# Patient Record
Sex: FEMALE | Race: WHITE | Employment: OTHER | URBAN - METROPOLITAN AREA
[De-identification: names, ages, dates, MRNs, and addresses within clinical notes are randomized per-mention and may not be internally consistent; named-entity substitution may affect disease eponyms.]

---

## 2017-04-27 RX ORDER — SODIUM CHLORIDE 0.9 % (FLUSH) 0.9 %
10 SYRINGE (ML) INJECTION EVERY 12 HOURS SCHEDULED
Status: CANCELLED | OUTPATIENT
Start: 2017-04-27

## 2017-04-27 RX ORDER — SODIUM CHLORIDE 0.9 % (FLUSH) 0.9 %
10 SYRINGE (ML) INJECTION PRN
Status: CANCELLED | OUTPATIENT
Start: 2017-04-27

## 2017-04-27 RX ORDER — LIDOCAINE HYDROCHLORIDE 10 MG/ML
1 INJECTION, SOLUTION EPIDURAL; INFILTRATION; INTRACAUDAL; PERINEURAL
Status: CANCELLED | OUTPATIENT
Start: 2017-04-27 | End: 2017-04-27

## 2017-04-27 RX ORDER — SODIUM CHLORIDE 9 MG/ML
INJECTION, SOLUTION INTRAVENOUS CONTINUOUS
Status: CANCELLED | OUTPATIENT
Start: 2017-04-27

## 2017-04-28 ENCOUNTER — HOSPITAL ENCOUNTER (OUTPATIENT)
Dept: MAMMOGRAPHY | Age: 72
Discharge: HOME | End: 2017-04-28
Attending: OBSTETRICS & GYNECOLOGY | Admitting: OBSTETRICS & GYNECOLOGY

## 2017-04-28 DIAGNOSIS — Z12.31 ENCOUNTER FOR SCREENING MAMMOGRAM FOR BREAST CANCER: ICD-10-CM

## 2017-05-04 ENCOUNTER — HOSPITAL ENCOUNTER (OUTPATIENT)
Dept: ENDOSCOPY | Age: 72
Discharge: HOME | End: 2017-04-28
Attending: OBSTETRICS & GYNECOLOGY | Admitting: OBSTETRICS & GYNECOLOGY

## 2017-05-04 ENCOUNTER — HOSPITAL ENCOUNTER (OUTPATIENT)
Dept: ENDOSCOPY | Age: 72
Discharge: HOME | End: 2017-05-04
Attending: INTERNAL MEDICINE | Admitting: INTERNAL MEDICINE

## 2017-05-04 RX ORDER — SODIUM CHLORIDE 9 MG/ML
INJECTION, SOLUTION INTRAVENOUS CONTINUOUS
Status: DISCONTINUED | OUTPATIENT
Start: 2017-05-04 | End: 2017-05-05 | Stop reason: HOSPADM

## 2017-05-04 RX ORDER — SODIUM CHLORIDE 0.9 % (FLUSH) 0.9 %
10 SYRINGE (ML) INJECTION PRN
Status: DISCONTINUED | OUTPATIENT
Start: 2017-05-04 | End: 2017-05-05 | Stop reason: HOSPADM

## 2017-05-04 RX ORDER — LIDOCAINE HYDROCHLORIDE 10 MG/ML
1 INJECTION, SOLUTION EPIDURAL; INFILTRATION; INTRACAUDAL; PERINEURAL
Status: ACTIVE | OUTPATIENT
Start: 2017-05-04 | End: 2017-05-04

## 2017-05-04 RX ORDER — SODIUM CHLORIDE 0.9 % (FLUSH) 0.9 %
10 SYRINGE (ML) INJECTION EVERY 12 HOURS SCHEDULED
Status: DISCONTINUED | OUTPATIENT
Start: 2017-05-04 | End: 2017-05-05 | Stop reason: HOSPADM

## 2018-09-27 ENCOUNTER — HOSPITAL ENCOUNTER (OUTPATIENT)
Dept: GENERAL RADIOLOGY | Age: 73
Discharge: HOME | End: 2018-09-27
Payer: MEDICARE

## 2018-09-27 ENCOUNTER — HOSPITAL ENCOUNTER (OUTPATIENT)
Dept: WOMENS IMAGING | Age: 73
Discharge: HOME | End: 2018-09-27
Payer: MEDICARE

## 2018-09-27 DIAGNOSIS — Z78.0 POSTMENOPAUSAL: ICD-10-CM

## 2018-09-27 DIAGNOSIS — Z12.31 VISIT FOR SCREENING MAMMOGRAM: ICD-10-CM

## 2018-09-27 PROCEDURE — 77063 BREAST TOMOSYNTHESIS BI: CPT

## 2018-09-27 PROCEDURE — 77080 DXA BONE DENSITY AXIAL: CPT

## 2019-09-30 ENCOUNTER — HOSPITAL ENCOUNTER (OUTPATIENT)
Dept: WOMENS IMAGING | Age: 74
Discharge: HOME | End: 2019-09-30
Payer: MEDICARE

## 2019-09-30 DIAGNOSIS — Z12.31 ENCOUNTER FOR SCREENING MAMMOGRAM FOR BREAST CANCER: ICD-10-CM

## 2019-09-30 PROCEDURE — 77063 BREAST TOMOSYNTHESIS BI: CPT

## 2020-11-02 ENCOUNTER — HOSPITAL ENCOUNTER (OUTPATIENT)
Dept: WOMENS IMAGING | Age: 75
Discharge: HOME | End: 2020-11-02
Payer: MEDICARE

## 2020-11-02 ENCOUNTER — HOSPITAL ENCOUNTER (OUTPATIENT)
Dept: GENERAL RADIOLOGY | Age: 75
Discharge: HOME | End: 2020-11-02
Payer: MEDICARE

## 2020-11-02 PROCEDURE — 77080 DXA BONE DENSITY AXIAL: CPT

## 2020-11-02 PROCEDURE — 77063 BREAST TOMOSYNTHESIS BI: CPT

## 2021-12-09 ENCOUNTER — HOSPITAL ENCOUNTER (OUTPATIENT)
Dept: WOMENS IMAGING | Age: 76
Discharge: HOME | End: 2021-12-09
Payer: MEDICARE

## 2021-12-09 VITALS — WEIGHT: 114 LBS | HEIGHT: 62 IN | BODY MASS INDEX: 20.98 KG/M2

## 2021-12-09 DIAGNOSIS — Z12.31 BREAST CANCER SCREENING BY MAMMOGRAM: ICD-10-CM

## 2021-12-09 PROCEDURE — 77063 BREAST TOMOSYNTHESIS BI: CPT

## 2023-02-22 ENCOUNTER — HOSPITAL ENCOUNTER (OUTPATIENT)
Dept: WOMENS IMAGING | Age: 78
Discharge: HOME | End: 2023-02-22
Payer: MEDICARE

## 2023-02-22 ENCOUNTER — HOSPITAL ENCOUNTER (OUTPATIENT)
Dept: GENERAL RADIOLOGY | Age: 78
Discharge: HOME | End: 2023-02-22
Payer: MEDICARE

## 2023-02-22 VITALS — WEIGHT: 116 LBS | HEIGHT: 61 IN | BODY MASS INDEX: 21.9 KG/M2

## 2023-02-22 DIAGNOSIS — Z12.31 VISIT FOR SCREENING MAMMOGRAM: ICD-10-CM

## 2023-02-22 DIAGNOSIS — M85.89 DISAPPEARING BONE DISEASE: ICD-10-CM

## 2023-02-22 PROCEDURE — 77067 SCR MAMMO BI INCL CAD: CPT

## 2023-02-22 PROCEDURE — 77080 DXA BONE DENSITY AXIAL: CPT

## 2023-05-15 ENCOUNTER — HOSPITAL ENCOUNTER (OUTPATIENT)
Age: 78
Setting detail: OUTPATIENT SURGERY
Discharge: HOME | End: 2023-05-15
Attending: INTERNAL MEDICINE | Admitting: INTERNAL MEDICINE
Payer: MEDICARE

## 2023-05-15 ENCOUNTER — ANESTHESIA EVENT (OUTPATIENT)
Dept: ENDOSCOPY | Age: 78
End: 2023-05-15
Payer: MEDICARE

## 2023-05-15 ENCOUNTER — ANESTHESIA (OUTPATIENT)
Dept: ENDOSCOPY | Age: 78
End: 2023-05-15
Payer: MEDICARE

## 2023-05-15 VITALS
SYSTOLIC BLOOD PRESSURE: 110 MMHG | HEIGHT: 61 IN | WEIGHT: 112 LBS | HEART RATE: 71 BPM | RESPIRATION RATE: 14 BRPM | TEMPERATURE: 97.1 F | DIASTOLIC BLOOD PRESSURE: 78 MMHG | OXYGEN SATURATION: 100 % | BODY MASS INDEX: 21.14 KG/M2

## 2023-05-15 DIAGNOSIS — Z86.010 HISTORY OF COLON POLYPS: ICD-10-CM

## 2023-05-15 DIAGNOSIS — Z80.0 FAMILY HISTORY OF COLON CANCER: ICD-10-CM

## 2023-05-15 PROCEDURE — 2580000003 HC RX 258: Performed by: ANESTHESIOLOGY

## 2023-05-15 PROCEDURE — 3700000000 HC ANESTHESIA ATTENDED CARE: Performed by: INTERNAL MEDICINE

## 2023-05-15 PROCEDURE — 2709999900 HC NON-CHARGEABLE SUPPLY: Performed by: INTERNAL MEDICINE

## 2023-05-15 PROCEDURE — 6360000002 HC RX W HCPCS: Performed by: NURSE ANESTHETIST, CERTIFIED REGISTERED

## 2023-05-15 PROCEDURE — 3700000001 HC ADD 15 MINUTES (ANESTHESIA): Performed by: INTERNAL MEDICINE

## 2023-05-15 PROCEDURE — 3609010600 HC COLONOSCOPY POLYPECTOMY SNARE/COLD BIOPSY: Performed by: INTERNAL MEDICINE

## 2023-05-15 PROCEDURE — 7100000010 HC PHASE II RECOVERY - FIRST 15 MIN: Performed by: INTERNAL MEDICINE

## 2023-05-15 PROCEDURE — 7100000011 HC PHASE II RECOVERY - ADDTL 15 MIN: Performed by: INTERNAL MEDICINE

## 2023-05-15 PROCEDURE — 88305 TISSUE EXAM BY PATHOLOGIST: CPT

## 2023-05-15 RX ORDER — SODIUM CHLORIDE, SODIUM LACTATE, POTASSIUM CHLORIDE, CALCIUM CHLORIDE 600; 310; 30; 20 MG/100ML; MG/100ML; MG/100ML; MG/100ML
INJECTION, SOLUTION INTRAVENOUS CONTINUOUS
Status: DISCONTINUED | OUTPATIENT
Start: 2023-05-15 | End: 2023-05-15 | Stop reason: HOSPADM

## 2023-05-15 RX ORDER — PROPOFOL 10 MG/ML
INJECTION, EMULSION INTRAVENOUS CONTINUOUS PRN
Status: DISCONTINUED | OUTPATIENT
Start: 2023-05-15 | End: 2023-05-15 | Stop reason: SDUPTHER

## 2023-05-15 RX ORDER — LIDOCAINE HYDROCHLORIDE 20 MG/ML
INJECTION, SOLUTION INTRAVENOUS PRN
Status: DISCONTINUED | OUTPATIENT
Start: 2023-05-15 | End: 2023-05-15 | Stop reason: SDUPTHER

## 2023-05-15 RX ADMIN — SODIUM CHLORIDE, SODIUM LACTATE, POTASSIUM CHLORIDE, AND CALCIUM CHLORIDE: .6; .31; .03; .02 INJECTION, SOLUTION INTRAVENOUS at 10:00

## 2023-05-15 RX ADMIN — LIDOCAINE HYDROCHLORIDE 100 MG: 20 INJECTION, SOLUTION INTRAVENOUS at 10:25

## 2023-05-15 RX ADMIN — PROPOFOL 140 MCG/KG/MIN: 10 INJECTION, EMULSION INTRAVENOUS at 10:25

## 2023-05-15 ASSESSMENT — PAIN SCALES - GENERAL: PAINLEVEL_OUTOF10: 0

## 2023-05-15 ASSESSMENT — PAIN SCALES - WONG BAKER: WONGBAKER_NUMERICALRESPONSE: 0

## 2023-05-15 ASSESSMENT — LIFESTYLE VARIABLES: SMOKING_STATUS: 0

## 2023-05-15 NOTE — OP NOTE
Colonoscopy Procedure Note    Patient: Ishmael Matthew MRN: [id number]     YOB: 1945  Age: 65 y.o. Sex: female    Unit: [facility] ENDOSCOPY Room/Bed: [facility]/NONE Location: [address] [place] [address]       Colonoscopy with polypectomy (cold snare)    Admitting Physician: Carlton Ojeda     Primary Care Physician: Chris Gomez      Preoperative Diagnosis: Pre-Op Diagnosis Codes:     * Family history of colon cancer [Z80.0]     * History of colon polyps [Z86.010]      DATE OF OPERATION: 5/15/2023    OPERATIVE SURGEON: Anjum Hale MD      ANESTHESIA: Monitor Anesthesia Care      Procedure Details:    After informed consent was obtained with all risks and benefits of procedure explained and preoperative exam completed, the patient was taken to the endoscopy suite and placed in the left lateral decubitus position. Upon sequential sedation as per above, a digital rectal exam was performed and was normal.  The Olympus videocolonoscope  was inserted in the rectum and carefully advanced to the cecum. Cecum Intubated Yes. The quality of preparation was good. The colonoscope was slowly withdrawn with careful evaluation between folds. Retroflexion in the rectum was performed. Estimated Blood Loss: minimal    Complications:  none    Findings:   hemorrhoids internal and external, Small in size  polyp(s) #1, 7 mm in size, located in the transverse colon removed by cold snare and retrieved for pathology  #2, 6 mm in size, located in the transverse colon removed by cold snare and retrieved for pathology  #3, 5 mm in size, located in the transverse colon removed by cold snare and retrieved for pathology  #4, 4 mm in size, located in the sigmoid removed by cold snare and retrieved for pathology    Plan: Await pathology results.         Signed By: Anjum Hale MD

## 2023-05-15 NOTE — H&P
[facility] ENDOSCOPY  Outpatient Procedure H&P    Patient: Erica Black MRN: [id number]     YOB: 1945  Age: 65 y.o. Sex: female    Unit: [facility] ENDOSCOPY Room/Bed: Endo Pool/NONE Location: [address] [facility]     Procedure: Procedure(s):  COLONOSCOPY    Indication: Pre-Op Diagnosis Codes:     * Family history of colon cancer [Z80.0]     * History of colon polyps [Z86.010]    Referring  Physician:          Nurses past medical history notes reviewed and agreed. Medications reviewed.     Allergies: Latex     Allergies noted: Yes     Past Medical History:   Past Medical History:   Diagnosis Date    Arthritis     Osteopenia        Past Surgical History:   Past Surgical History:   Procedure Laterality Date    FINGER SURGERY      FOOT SURGERY      chely bunion    FRACTURE SURGERY      ankle    HYSTERECTOMY (CERVIX STATUS UNKNOWN)      OVARY REMOVAL         Social History:   Social History     Socioeconomic History    Marital status:      Spouse name: Not on file    Number of children: Not on file    Years of education: Not on file    Highest education level: Not on file   Occupational History    Not on file   Tobacco Use    Smoking status: Never    Smokeless tobacco: Never   Vaping Use    Vaping Use: Never used   Substance and Sexual Activity    Alcohol use: Yes     Comment: rare    Drug use: No    Sexual activity: Not on file   Other Topics Concern    Not on file   Social History Narrative    Not on file     Social Determinants of Health     Financial Resource Strain: Not on file   Food Insecurity: Not on file   Transportation Needs: Not on file   Physical Activity: Not on file   Stress: Not on file   Social Connections: Not on file   Intimate Partner Violence: Not on file   Housing Stability: Not on file       Family History:   Family History   Problem Relation Age of Onset    Stroke Mother     High Cholesterol Mother     Asthma Father        Home Medications:   Prior to Admission medications

## 2023-05-15 NOTE — PROGRESS NOTES
Ambulatory Surgery/Procedure Discharge Note    Vitals:    05/15/23 1114   BP: 110/78   Pulse: 71   Resp: 14   Temp: 97.1 °F (36.2 °C)   SpO2: 100%       In: 400 [I.V.:400]  Out: -     Restroom use offered before discharge. Yes  Pt is alert and tolerates PO well. Denies nausea and discharge instructions were read at bedside. Pain assessment:  none  Pain Level: 0        Patient discharged to home/self care.  Patient discharged via wheel chair by this nurse to waiting family/S.O.       5/15/2023 11:39 AM

## 2023-05-15 NOTE — ANESTHESIA PRE PROCEDURE
Department of Anesthesiology  Preprocedure Note       Name:  Indiana Nice   Age:  65 y.o.  :  1945                                          MRN:  [id number]         Date:  5/15/2023      Surgeon: Ashley Graham):  Elberta Skiff, MD    Procedure: Procedure(s):  COLONOSCOPY    Medications prior to admission:   Prior to Admission medications    Medication Sig Start Date End Date Taking? Authorizing Provider   atorvastatin (LIPITOR) 20 MG tablet Take 20 mg by mouth daily    Historical Provider, MD   folic acid (FOLVITE) 1 MG tablet Take 1 mg by mouth daily    Historical Provider, MD   b complex vitamins capsule Take 1 capsule by mouth daily    Historical Provider, MD   Coenzyme Q10 (COQ10 PO) Take by mouth    Historical Provider, MD   Biotin 5000 MCG CAPS Take by mouth    Historical Provider, MD   Cholecalciferol (VITAMIN D) 2000 UNITS CAPS capsule Take by mouth    Historical Provider, MD   Multiple Vitamins-Minerals (THERAPEUTIC MULTIVITAMIN-MINERALS) tablet Take 1 tablet by mouth daily    Historical Provider, MD   calcium carbonate (OSCAL) 500 MG TABS tablet Take 500 mg by mouth daily    Historical Provider, MD   InFLIXimab (REMICADE IV) Infuse intravenously Every 8 weeks    Historical Provider, MD   methotrexate (RHEUMATREX) 2.5 MG chemo tablet Take 7.5 mg by mouth every 7 days    Historical Provider, MD       Current medications:    Current Facility-Administered Medications   Medication Dose Route Frequency Provider Last Rate Last Admin    lactated ringers IV soln infusion   IntraVENous Continuous Melvin Lewis MD           Allergies: Allergies   Allergen Reactions    Latex Hives    Pcn [Penicillins] Hives       Problem List:  There is no problem list on file for this patient.       Past Medical History:        Diagnosis Date    Arthritis     Osteopenia        Past Surgical History:        Procedure Laterality Date    FINGER SURGERY      FOOT SURGERY      chely bunion    FRACTURE SURGERY      ankle   

## 2023-06-09 NOTE — ANESTHESIA POSTPROCEDURE EVALUATION
Department of Anesthesiology  Postprocedure Note    Patient: Prasanth Loving  MRN: [id number]  YOB: 1945  Date of evaluation: 5/15/2023      Procedure Summary     Date: 05/15/23 Room / Location: [address] [place] [address] [address] / [facility]    Anesthesia Start: 1022 Anesthesia Stop: 1057    Procedure: COLONOSCOPY POLYPECTOMY SNARE/COLD BIOPSY Diagnosis:       Family history of colon cancer      History of colon polyps      (Family history of colon cancer [Z80.0] History of colon polyps [Z86.010])    Surgeons: Ileana Villalpando MD Responsible Provider: Neisha Connell DO    Anesthesia Type: MAC ASA Status: 2          Anesthesia Type: No value filed.     Lawson Phase I: Lawson Score: 10    Lawson Phase II: Lawson Score: 10      Anesthesia Post Evaluation    Patient location during evaluation: PACU  Patient participation: complete - patient participated  Level of consciousness: awake and alert  Airway patency: patent  Nausea & Vomiting: no nausea and no vomiting  Cardiovascular status: blood pressure returned to baseline  Respiratory status: acceptable  Hydration status: euvolemic
Other

## 2024-03-26 ENCOUNTER — HOSPITAL ENCOUNTER (OUTPATIENT)
Dept: WOMENS IMAGING | Age: 79
Discharge: HOME | End: 2024-03-26
Payer: MEDICARE

## 2024-03-26 VITALS — HEIGHT: 61 IN | BODY MASS INDEX: 22.09 KG/M2 | WEIGHT: 117 LBS

## 2024-03-26 DIAGNOSIS — Z12.31 VISIT FOR SCREENING MAMMOGRAM: ICD-10-CM

## 2024-03-26 PROCEDURE — 77063 BREAST TOMOSYNTHESIS BI: CPT

## 2025-03-27 ENCOUNTER — HOSPITAL ENCOUNTER (OUTPATIENT)
Dept: WOMENS IMAGING | Age: 80
Discharge: HOME | End: 2025-03-27
Payer: MEDICARE

## 2025-03-27 ENCOUNTER — HOSPITAL ENCOUNTER (OUTPATIENT)
Dept: GENERAL RADIOLOGY | Age: 80
Discharge: HOME | End: 2025-03-27
Payer: MEDICARE

## 2025-03-27 VITALS — BODY MASS INDEX: 21.52 KG/M2 | HEIGHT: 61 IN | WEIGHT: 114 LBS

## 2025-03-27 DIAGNOSIS — Z12.31 VISIT FOR SCREENING MAMMOGRAM: ICD-10-CM

## 2025-03-27 DIAGNOSIS — M85.88 OSTEOPENIA OF LUMBAR SPINE: ICD-10-CM

## 2025-03-27 DIAGNOSIS — Z13.820 SPECIAL SCREENING FOR OSTEOPOROSIS: ICD-10-CM

## 2025-03-27 PROCEDURE — 77063 BREAST TOMOSYNTHESIS BI: CPT

## 2025-03-27 PROCEDURE — 77080 DXA BONE DENSITY AXIAL: CPT

## (undated) DEVICE — SNARE COLD DIAMOND 10MM THIN

## (undated) DEVICE — TRAP SPEC RETRV CLR PLAS POLYP IN LN SUCT QUIK CTCH

## (undated) DEVICE — CANNULA SAMP CO2 AD GRN 7FT CO2 AND 7FT O2 TBNG UNIV CONN